# Patient Record
Sex: MALE | Race: WHITE | NOT HISPANIC OR LATINO | URBAN - METROPOLITAN AREA
[De-identification: names, ages, dates, MRNs, and addresses within clinical notes are randomized per-mention and may not be internally consistent; named-entity substitution may affect disease eponyms.]

---

## 2023-05-09 ENCOUNTER — TELEPHONE ENCOUNTER (OUTPATIENT)
Dept: URBAN - METROPOLITAN AREA CLINIC 113 | Facility: CLINIC | Age: 68
End: 2023-05-09

## 2023-05-09 ENCOUNTER — WEB ENCOUNTER (OUTPATIENT)
Dept: URBAN - METROPOLITAN AREA CLINIC 113 | Facility: CLINIC | Age: 68
End: 2023-05-09

## 2023-05-09 ENCOUNTER — OFFICE VISIT (OUTPATIENT)
Dept: URBAN - METROPOLITAN AREA CLINIC 113 | Facility: CLINIC | Age: 68
End: 2023-05-09
Payer: MEDICARE

## 2023-05-09 VITALS
WEIGHT: 292.8 LBS | BODY MASS INDEX: 33.88 KG/M2 | RESPIRATION RATE: 18 BRPM | DIASTOLIC BLOOD PRESSURE: 62 MMHG | TEMPERATURE: 97.3 F | HEIGHT: 78 IN | SYSTOLIC BLOOD PRESSURE: 124 MMHG | HEART RATE: 53 BPM

## 2023-05-09 DIAGNOSIS — N28.9 CHRONIC KIDNEY DISEASE: ICD-10-CM

## 2023-05-09 DIAGNOSIS — D50.0 ANEMIA: ICD-10-CM

## 2023-05-09 DIAGNOSIS — K74.60 CIRRHOSIS: ICD-10-CM

## 2023-05-09 PROCEDURE — 99203 OFFICE O/P NEW LOW 30 MIN: CPT | Performed by: INTERNAL MEDICINE

## 2023-05-09 RX ORDER — SODIUM ZIRCONIUM CYCLOSILICATE 10 G/10G
1 PACKET DISSOLVED IN WATER POWDER, FOR SUSPENSION ORAL ONCE A DAY
Status: ACTIVE | COMMUNITY

## 2023-05-09 RX ORDER — NEBIVOLOL HYDROCHLORIDE 5 MG/1
1 TABLET TABLET ORAL ONCE A DAY
Status: ACTIVE | COMMUNITY

## 2023-05-09 RX ORDER — FUROSEMIDE 40 MG/1
1 TABLET TABLET ORAL ONCE A DAY
Status: ACTIVE | COMMUNITY

## 2023-05-09 RX ORDER — LEVOTHYROXINE SODIUM 50 UG/1
1 TABLET IN THE MORNING ON AN EMPTY STOMACH TABLET ORAL ONCE A DAY
Status: ACTIVE | COMMUNITY

## 2023-05-09 RX ORDER — ALLOPURINOL 100 MG/1
1 TABLET TABLET ORAL ONCE A DAY
Status: ACTIVE | COMMUNITY

## 2023-05-09 RX ORDER — LISINOPRIL 20 MG/1
1 TABLET TABLET ORAL ONCE A DAY
Status: ACTIVE | COMMUNITY

## 2023-05-09 NOTE — HPI-TODAY'S VISIT:
. Labs May 4, 2023.  Sodium 139, potassium 5.5.  BUN 48, creatinine 2.1.  AST 21, ALT 10.  Alk phos is 141.  Albumin 3.7.  Total bilirubin 0.5.

## 2023-05-09 NOTE — HPI-TODAY'S VISIT:
Very pleasant 67-year-old man presents with a history of cirrhosis and ascites.  Previously managed by Dr. Keenan in [facility].  Recently relocated to our area. . He was originally diagnosed with cirrhosis when he developed ascites in August 2022.  He had been drinking alcohol on a daily basis prior to that.  He immediately stopped drinking alcohol.  He had about 3 paracentesis performed.  These were large-volume paracentesis.  He was previously on spironolactone and Lasix.  He stopped the spironolactone because of hyperkalemia.  He has actually had relatively easily controlled ascites since then.  He does not have any current major issues with edema.  He has chronic lymphedema.  Appetite is fine.  He does not have dysphagia heartburn or regurgitation.  He states he had a previous upper endoscopy which was notable for no varices.  He does not have diarrhea or constipation.  He does not have rectal bleeding or melena at this time.  Weight is actually relatively stable all considered. . He was seen by liver transplant physicians in the [facility].  Transplantation was not recommended due to stability of his chronic liver disease and cessation of alcohol. . He does have chronic kidney disease and was followed by nephrologist in the [facility].  We need to transition to a nephrologist here in our area.

## 2023-05-31 ENCOUNTER — TELEPHONE ENCOUNTER (OUTPATIENT)
Dept: URBAN - METROPOLITAN AREA CLINIC 6 | Facility: CLINIC | Age: 68
End: 2023-05-31

## 2023-09-12 ENCOUNTER — WEB ENCOUNTER (OUTPATIENT)
Dept: URBAN - METROPOLITAN AREA CLINIC 113 | Facility: CLINIC | Age: 68
End: 2023-09-12

## 2023-09-12 ENCOUNTER — OFFICE VISIT (OUTPATIENT)
Dept: URBAN - METROPOLITAN AREA CLINIC 113 | Facility: CLINIC | Age: 68
End: 2023-09-12
Payer: MEDICARE

## 2023-09-12 VITALS
HEART RATE: 65 BPM | HEIGHT: 78 IN | DIASTOLIC BLOOD PRESSURE: 46 MMHG | TEMPERATURE: 97.7 F | SYSTOLIC BLOOD PRESSURE: 112 MMHG | BODY MASS INDEX: 32.74 KG/M2 | WEIGHT: 283 LBS | RESPIRATION RATE: 18 BRPM

## 2023-09-12 DIAGNOSIS — D50.0 ANEMIA: ICD-10-CM

## 2023-09-12 DIAGNOSIS — K74.60 CIRRHOSIS: ICD-10-CM

## 2023-09-12 DIAGNOSIS — N18.9 CHRONIC KIDNEY DISEASE: ICD-10-CM

## 2023-09-12 PROCEDURE — 99214 OFFICE O/P EST MOD 30 MIN: CPT | Performed by: INTERNAL MEDICINE

## 2023-09-12 RX ORDER — LEVOTHYROXINE SODIUM 50 UG/1
1 TABLET IN THE MORNING ON AN EMPTY STOMACH TABLET ORAL ONCE A DAY
Status: ACTIVE | COMMUNITY

## 2023-09-12 RX ORDER — LISINOPRIL 20 MG/1
1 TABLET TABLET ORAL ONCE A DAY
Status: ACTIVE | COMMUNITY

## 2023-09-12 RX ORDER — COLCHICINE 0.6 MG/1
0.6MG TABLET, FILM COATED ORAL ONCE A DAY
Status: ACTIVE | COMMUNITY

## 2023-09-12 RX ORDER — GABAPENTIN 300 MG/1
1 CAPSULE CAPSULE ORAL ONCE A DAY
Status: ACTIVE | COMMUNITY

## 2023-09-12 RX ORDER — ALLOPURINOL 100 MG/1
1 TABLET TABLET ORAL ONCE A DAY
Status: ACTIVE | COMMUNITY

## 2023-09-12 RX ORDER — LORATADINE 10 MG
1 TABLET TABLET ORAL ONCE A DAY
Status: ACTIVE | COMMUNITY

## 2023-09-12 RX ORDER — SODIUM ZIRCONIUM CYCLOSILICATE 10 G/10G
1 PACKET DISSOLVED IN WATER POWDER, FOR SUSPENSION ORAL ONCE A DAY
Status: ON HOLD | COMMUNITY

## 2023-09-12 RX ORDER — FUROSEMIDE 40 MG/1
1 TABLET TABLET ORAL TWICE A DAY
Status: ACTIVE | COMMUNITY

## 2023-09-12 RX ORDER — DICLOFENAC SODIUM TOPICAL GEL, 1% 10 MG/G
AS DIRECTED GEL TOPICAL
Status: ACTIVE | COMMUNITY

## 2023-09-12 RX ORDER — MYCOPHENOLATE MOFETIL 500 MG/1
1 TABLET TABLET ORAL TWICE A DAY
Status: ACTIVE | COMMUNITY

## 2023-09-12 RX ORDER — NEBIVOLOL HYDROCHLORIDE 5 MG/1
1 TABLET TABLET ORAL ONCE A DAY
Status: ACTIVE | COMMUNITY

## 2023-09-12 NOTE — HPI-TODAY'S VISIT:
68-year-old man presents with a history of cirrhosis and ascites.  Previously managed by Dr. Keenan in [facility].  Recently relocated to our area. . From a hepatic standpoint, he is stable.  He follows a low-salt diet.  No ascites.  No significant edema.  He has significant gout.  He has seen Dr. Vu.  Treatment of gout is planned with Krystexxa.  No nausea vomiting.  No diarrhea or constipation.  No rectal bleeding or melena.  Weight is relatively stable.  Edema is controlled.  He was started on mycophenolate and plans for his gout therapy. . He was originally diagnosed with cirrhosis when he developed ascites in August 2022.  He had been drinking alcohol on a daily basis prior to that.  He immediately stopped drinking alcohol.  He had about 3 paracentesis performed.  These were large-volume paracentesis.  He was previously on spironolactone and Lasix.  He stopped the spironolactone because of hyperkalemia.  He has actually had relatively easily controlled ascites since then.   . He was seen by liver transplant physicians in the [facility].  Transplantation was not recommended due to stability of his chronic liver disease and cessation of alcohol. . He does have chronic kidney disease and was followed by nephrologist in the [facility].  He is now under the care of Dr. Vu. . He is a retired respiratory therapist . He has been diagnosed with gout and therapy with Krystexxa is planned.

## 2023-09-12 NOTE — HPI-TODAY'S VISIT:
. Labs August 2023.  White blood cell count 4.6.  Hemoglobin 12.0.  Platelet count 141,000.  BUN 41 creatinine 1.48.  AST 25, ALT 23.  Alkaline phos is 151.  Total bilirubin is 0.4. . Labs May 4, 2023.  Sodium 139, potassium 5.5.  BUN 48, creatinine 2.1.  AST 21, ALT 10.  Alk phos is 141.  Albumin 3.7.  Total bilirubin 0.5.

## 2023-09-12 NOTE — PHYSICAL EXAM LUNGS:
clear to auscultation bilaterally, good air movement , clear to auscultation bilaterally, good air movement

## 2024-05-09 ENCOUNTER — DASHBOARD ENCOUNTERS (OUTPATIENT)
Age: 69
End: 2024-05-09

## 2024-05-09 ENCOUNTER — LAB OUTSIDE AN ENCOUNTER (OUTPATIENT)
Dept: URBAN - METROPOLITAN AREA CLINIC 113 | Facility: CLINIC | Age: 69
End: 2024-05-09

## 2024-05-09 ENCOUNTER — OFFICE VISIT (OUTPATIENT)
Dept: URBAN - METROPOLITAN AREA CLINIC 113 | Facility: CLINIC | Age: 69
End: 2024-05-09
Payer: MEDICARE

## 2024-05-09 VITALS
HEIGHT: 78 IN | SYSTOLIC BLOOD PRESSURE: 123 MMHG | HEART RATE: 55 BPM | TEMPERATURE: 98.1 F | RESPIRATION RATE: 18 BRPM | WEIGHT: 315 LBS | DIASTOLIC BLOOD PRESSURE: 62 MMHG | BODY MASS INDEX: 36.45 KG/M2

## 2024-05-09 DIAGNOSIS — K74.60 CIRRHOSIS: ICD-10-CM

## 2024-05-09 DIAGNOSIS — D64.9 ANEMIA: ICD-10-CM

## 2024-05-09 DIAGNOSIS — N18.9 CHRONIC KIDNEY DISEASE: ICD-10-CM

## 2024-05-09 DIAGNOSIS — D50.0 ANEMIA: ICD-10-CM

## 2024-05-09 PROCEDURE — 99214 OFFICE O/P EST MOD 30 MIN: CPT | Performed by: INTERNAL MEDICINE

## 2024-05-09 RX ORDER — FUROSEMIDE 40 MG/1
1 TABLET TABLET ORAL TWICE A DAY
Status: ACTIVE | COMMUNITY

## 2024-05-09 RX ORDER — NEBIVOLOL HYDROCHLORIDE 5 MG/1
1 TABLET TABLET ORAL ONCE A DAY
Status: ACTIVE | COMMUNITY

## 2024-05-09 RX ORDER — PEGLOTICASE 8 MG/ML
1 ML INJECTION, SOLUTION INTRAVENOUS
Status: ACTIVE | COMMUNITY

## 2024-05-09 RX ORDER — COLCHICINE 0.6 MG/1
0.6MG TABLET, FILM COATED ORAL ONCE A DAY
Status: ACTIVE | COMMUNITY

## 2024-05-09 RX ORDER — MYCOPHENOLATE MOFETIL 500 MG/1
1 TABLET TABLET ORAL TWICE A DAY
Status: ACTIVE | COMMUNITY

## 2024-05-09 RX ORDER — LISINOPRIL 20 MG/1
1 TABLET TABLET ORAL ONCE A DAY
Status: ACTIVE | COMMUNITY

## 2024-05-09 RX ORDER — LORATADINE 10 MG
1 TABLET TABLET ORAL ONCE A DAY
Status: ACTIVE | COMMUNITY

## 2024-05-09 RX ORDER — GABAPENTIN 300 MG/1
1 CAPSULE CAPSULE ORAL ONCE A DAY
Status: ACTIVE | COMMUNITY

## 2024-11-04 ENCOUNTER — OFFICE VISIT (OUTPATIENT)
Dept: URBAN - METROPOLITAN AREA CLINIC 113 | Facility: CLINIC | Age: 69
End: 2024-11-04
Payer: MEDICARE

## 2024-11-04 ENCOUNTER — LAB OUTSIDE AN ENCOUNTER (OUTPATIENT)
Dept: URBAN - METROPOLITAN AREA CLINIC 113 | Facility: CLINIC | Age: 69
End: 2024-11-04

## 2024-11-04 VITALS
WEIGHT: 315 LBS | DIASTOLIC BLOOD PRESSURE: 83 MMHG | RESPIRATION RATE: 20 BRPM | TEMPERATURE: 97.7 F | HEIGHT: 78 IN | SYSTOLIC BLOOD PRESSURE: 123 MMHG | HEART RATE: 52 BPM | BODY MASS INDEX: 36.45 KG/M2

## 2024-11-04 DIAGNOSIS — Z86.0100 HISTORY OF COLON POLYPS: ICD-10-CM

## 2024-11-04 DIAGNOSIS — K74.60 CIRRHOSIS: ICD-10-CM

## 2024-11-04 PROBLEM — 428283002: Status: ACTIVE | Noted: 2024-11-04

## 2024-11-04 PROCEDURE — 99214 OFFICE O/P EST MOD 30 MIN: CPT | Performed by: NURSE PRACTITIONER

## 2024-11-04 RX ORDER — PEGLOTICASE 8 MG/ML
1 ML INJECTION, SOLUTION INTRAVENOUS
Status: ACTIVE | COMMUNITY

## 2024-11-04 RX ORDER — FUROSEMIDE 40 MG/1
1 TABLET TABLET ORAL ONCE A DAY
Status: ACTIVE | COMMUNITY

## 2024-11-04 RX ORDER — GABAPENTIN 600 MG/1
1 TABLET TABLET, FILM COATED ORAL ONCE A DAY
Status: ACTIVE | COMMUNITY

## 2024-11-04 RX ORDER — MYCOPHENOLATE MOFETIL 500 MG/1
1 TABLET TABLET ORAL TWICE A DAY
Status: ACTIVE | COMMUNITY

## 2024-11-04 RX ORDER — LISINOPRIL 20 MG/1
1 TABLET TABLET ORAL ONCE A DAY
Status: ACTIVE | COMMUNITY

## 2024-11-04 RX ORDER — COLCHICINE 0.6 MG/1
0.6MG TABLET, FILM COATED ORAL ONCE A DAY
Status: ACTIVE | COMMUNITY

## 2024-11-04 RX ORDER — NEBIVOLOL HYDROCHLORIDE 5 MG/1
1 TABLET TABLET ORAL ONCE A DAY
Status: ACTIVE | COMMUNITY

## 2024-11-04 RX ORDER — LORATADINE 10 MG
1 TABLET TABLET ORAL ONCE A DAY
Status: ACTIVE | COMMUNITY

## 2024-11-04 NOTE — HPI-TODAY'S VISIT:
This is a 69-year-old male with a history of asthma/COPD, hypertension, gout, diabetes, peripheral arterial disease, chronic kidney disease, and cirrhosis associated with alcohol use presenting for follow-up. He was last seen 5/9/2024.  He was doing well without significant edema or ascites.  He  continue to abstain from alcohol.  He had labs planned with nephrology.  He was to have an alpha-fetoprotein.  Endoscopy for variceal screening was recommended in 1 to 2 years.  An ultrasound was ordered. He reports labs were drawn with Dr. Vu.  Alpha-fetoprotein was not obtained. He is doing well from a GI standpoint.  He denies any abdominal symptoms.  He had a shoulder surgery scheduled.  He had preoperative labs at [facility] on 9/16 and labs with his primary care physician in September.  Surgery was canceled because he developed mild cellulitis in his left lower extremity.  Prior to surgery, he had a cardiac clearance which included echocardiogram and EKG.  Krystexxa and colchicine were restarted 6 months ago to treat gout. Abdominal ultrasound 5/23/2024:Nodular cirrhotic liver with likely geographic fat within the left lobe.  This could be evaluated with MRI if clinically indicated.  No liver mass.  Cholelithiasis without evidence of acute cholecystitis.  Nonobstructing left nephrolithiasis.  (Report received post visit) GI records from Dr. Benavides have been obtained since his last visit.  Office notes mention a colonoscopy 12/2/2019 during which 3 cecal polyps, 3 pedunculated and sessile polyps in the ascending colon including polyps over 1 cm and a small rectosigmoid polyp.  The recommendation was to repeat colonoscopy in 6 months.  This was not performed due to decompensated liver cirrhosis at the time.  He had an EGD 1/13/2023 notable for a scar in the lower third of the esophagus, salmon-colored mucosa status post biopsy, small hiatal hernia, LA grade a esophagitis, gastritis, and gastric varices without bleeding.  Distal esophageal biopsies demonstrated squamocolumnar epithelium with reactive changes and mild chronic inflammation with rare intraepithelial eosinophils consistent with reflux.  No dysplasia or intestinal metaplasia was found.  Repeat EGD recommended in 1 year.

## 2024-11-04 NOTE — HPI-OTHER HISTORIES
GI records from Dr. Benavides have been obtained since his last visit. Office notes mention a colonoscopy 12/2/2019 during which 3 cecal polyps, 3 pedunculated and sessile polyps in the ascending colon including polyps over 1 cm and a small rectosigmoid polyp. The recommendation was to repeat colonoscopy in 6 months. This was not performed due to decompensated liver cirrhosis at the time. He had an EGD 1/13/2023 notable for a scar in the lower third of the esophagus, salmon-colored mucosa status post biopsy, small hiatal hernia, LA grade a esophagitis, gastritis, and gastric varices without bleeding. Distal esophageal biopsies demonstrated squamocolumnar epithelium with reactive changes and mild chronic inflammation with rare intraepithelial eosinophils consistent with reflux. No dysplasia or intestinal metaplasia was found. Repeat EGD recommended in 1 year.

## 2024-11-05 ENCOUNTER — TELEPHONE ENCOUNTER (OUTPATIENT)
Dept: URBAN - METROPOLITAN AREA CLINIC 113 | Facility: CLINIC | Age: 69
End: 2024-11-05

## 2024-11-07 ENCOUNTER — LAB OUTSIDE AN ENCOUNTER (OUTPATIENT)
Dept: URBAN - METROPOLITAN AREA CLINIC 113 | Facility: CLINIC | Age: 69
End: 2024-11-07

## 2025-01-29 ENCOUNTER — OFFICE VISIT (OUTPATIENT)
Dept: URBAN - METROPOLITAN AREA MEDICAL CENTER 19 | Facility: MEDICAL CENTER | Age: 70
End: 2025-01-29

## 2025-01-29 ENCOUNTER — TELEPHONE ENCOUNTER (OUTPATIENT)
Dept: URBAN - METROPOLITAN AREA CLINIC 113 | Facility: CLINIC | Age: 70
End: 2025-01-29

## 2025-01-29 ENCOUNTER — LAB OUTSIDE AN ENCOUNTER (OUTPATIENT)
Dept: URBAN - METROPOLITAN AREA CLINIC 113 | Facility: CLINIC | Age: 70
End: 2025-01-29

## 2025-01-29 RX ORDER — PEGLOTICASE 8 MG/ML
1 ML INJECTION, SOLUTION INTRAVENOUS
Status: ACTIVE | COMMUNITY

## 2025-01-29 RX ORDER — LISINOPRIL 20 MG/1
1 TABLET TABLET ORAL ONCE A DAY
Status: ACTIVE | COMMUNITY

## 2025-01-29 RX ORDER — FUROSEMIDE 40 MG/1
1 TABLET TABLET ORAL ONCE A DAY
Status: ACTIVE | COMMUNITY

## 2025-01-29 RX ORDER — NEBIVOLOL HYDROCHLORIDE 5 MG/1
1 TABLET TABLET ORAL ONCE A DAY
Status: ACTIVE | COMMUNITY

## 2025-01-29 RX ORDER — COLCHICINE 0.6 MG/1
0.6MG TABLET, FILM COATED ORAL ONCE A DAY
Status: ACTIVE | COMMUNITY

## 2025-01-29 RX ORDER — MYCOPHENOLATE MOFETIL 500 MG/1
1 TABLET TABLET ORAL TWICE A DAY
Status: ACTIVE | COMMUNITY

## 2025-01-29 RX ORDER — PANTOPRAZOLE SODIUM 40 MG/1
1 TABLET TABLET, DELAYED RELEASE ORAL ONCE A DAY
Qty: 90 TABLET | Refills: 3 | OUTPATIENT
Start: 2025-01-29

## 2025-01-29 RX ORDER — GABAPENTIN 600 MG/1
1 TABLET TABLET, FILM COATED ORAL ONCE A DAY
Status: ACTIVE | COMMUNITY

## 2025-01-29 RX ORDER — LORATADINE 10 MG
1 TABLET TABLET ORAL ONCE A DAY
Status: ACTIVE | COMMUNITY

## 2025-02-18 ENCOUNTER — OFFICE VISIT (OUTPATIENT)
Dept: URBAN - METROPOLITAN AREA CLINIC 113 | Facility: CLINIC | Age: 70
End: 2025-02-18
Payer: MEDICARE

## 2025-02-18 VITALS
SYSTOLIC BLOOD PRESSURE: 125 MMHG | TEMPERATURE: 97.3 F | HEIGHT: 78 IN | BODY MASS INDEX: 36.45 KG/M2 | WEIGHT: 315 LBS | DIASTOLIC BLOOD PRESSURE: 66 MMHG | RESPIRATION RATE: 18 BRPM | HEART RATE: 58 BPM

## 2025-02-18 DIAGNOSIS — N18.9 CHRONIC KIDNEY DISEASE: ICD-10-CM

## 2025-02-18 DIAGNOSIS — K74.60 CIRRHOSIS: ICD-10-CM

## 2025-02-18 DIAGNOSIS — D50.0 ANEMIA: ICD-10-CM

## 2025-02-18 DIAGNOSIS — Z86.0100 HISTORY OF COLON POLYPS: ICD-10-CM

## 2025-02-18 DIAGNOSIS — K20.90 ESOPHAGITIS: ICD-10-CM

## 2025-02-18 DIAGNOSIS — K80.20 CHOLELITHIASES: ICD-10-CM

## 2025-02-18 DIAGNOSIS — D64.9 ANEMIA: ICD-10-CM

## 2025-02-18 PROCEDURE — 99213 OFFICE O/P EST LOW 20 MIN: CPT | Performed by: INTERNAL MEDICINE

## 2025-02-18 PROCEDURE — 99214 OFFICE O/P EST MOD 30 MIN: CPT | Performed by: INTERNAL MEDICINE

## 2025-02-18 RX ORDER — LORATADINE 10 MG
1 TABLET TABLET ORAL ONCE A DAY
Status: ACTIVE | COMMUNITY

## 2025-02-18 RX ORDER — PEGLOTICASE 8 MG/ML
1 ML INJECTION, SOLUTION INTRAVENOUS
Status: ACTIVE | COMMUNITY

## 2025-02-18 RX ORDER — PANTOPRAZOLE SODIUM 40 MG/1
1 TABLET TABLET, DELAYED RELEASE ORAL ONCE A DAY
Qty: 90 TABLET | Refills: 3 | Status: ACTIVE | COMMUNITY
Start: 2025-01-29

## 2025-02-18 RX ORDER — LISINOPRIL 20 MG/1
1 TABLET TABLET ORAL ONCE A DAY
Status: ACTIVE | COMMUNITY

## 2025-02-18 RX ORDER — GABAPENTIN 600 MG/1
1 TABLET TABLET, FILM COATED ORAL ONCE A DAY
Status: ACTIVE | COMMUNITY

## 2025-02-18 RX ORDER — NEBIVOLOL HYDROCHLORIDE 5 MG/1
1 TABLET TABLET ORAL ONCE A DAY
Status: ACTIVE | COMMUNITY

## 2025-02-18 RX ORDER — COLCHICINE 0.6 MG/1
0.6MG TABLET, FILM COATED ORAL ONCE A DAY
Status: ACTIVE | COMMUNITY

## 2025-02-18 RX ORDER — FUROSEMIDE 40 MG/1
1 TABLET TABLET ORAL ONCE A DAY
Status: ACTIVE | COMMUNITY

## 2025-02-18 RX ORDER — MYCOPHENOLATE MOFETIL 500 MG/1
1 TABLET TABLET ORAL TWICE A DAY
Status: ACTIVE | COMMUNITY

## 2025-02-18 NOTE — HPI-TODAY'S VISIT:
69-year-old male with a history of asthma/COPD, hypertension, gout, diabetes, peripheral arterial disease, chronic kidney disease, and cirrhosis associated with alcohol use presenting for follow up of esophagitis and cirrhosis  He is not having any current GI symptoms. No reflux. He is compliant with pantoprazole. No dysphagia. Regular bowel movements. No diarrhea constipation. No rectal bleeding or melena. He has chronic kidney disease. He is followed by Dr. Vu for this issue. No nausea or vomiting. Has significant obesity. He will be discussing perhaps options for management at the time of his follow-up visit with Dr. Vu  Labs. December 2024. Creatinine 2.0, liver tests were normal, platelet count 179K, hemoglobin 12.4.  Labs. December 2024. Alpha-fetoprotein less than 2  Abdominal ultrasound. December 2024. Cholelithiasis. Cirrhosis noted. No liver lesions identified.  Colonoscopy January 29, 2025. Technically difficult colonoscopy due to sigmoid redundancy thick mucoid cecal material. 4 distinct 3 mm - 8 mm sessile cecal and ascending polyps removed by cold snare provide millimeter sessile sigmoid colon polyp removed by cold snare. Biopsies were notable for tubular adenomas. No dysplasia.  EGD. January 29, 2025. Granular mucosa notable at the GE junction. LA grade B reflux esophagitis one of the GE junction. Small grade 1 esophageal varices from 41 to 43 cm. Mild gastritis. Biopsies were negative for H. pylori.  Labs. December 2024. Hemoglobin 12.0, MCV 96, platelet count 181K, creatinine 1.9, AST 17, ALT 31, alkaline phosphatase 94, AFP less than 2, INR 1.0  Krystexxa and colchicine were restarted 6 months ago to treat gout.  Abdominal ultrasound 5/23/2024:Nodular cirrhotic liver with likely geographic fat within the left lobe.  This could be evaluated with MRI if clinically indicated.  No liver mass.  Cholelithiasis without evidence of acute cholecystitis.  Nonobstructing left nephrolithiasis.  (Report received post visit)  Colonoscopy 12/2/2019 during which 3 cecal polyps, 3 pedunculated and sessile polyps in the ascending colon including polyps over 1 cm and a small rectosigmoid polyp.  T  EGD 1/13/2023 notable for a scar in the lower third of the esophagus, salmon-colored mucosa status post biopsy, small hiatal hernia, LA grade A esophagitis, gastritis, and gastric varices without bleeding.  Distal esophageal biopsies demonstrated squamocolumnar epithelium with reactive changes and mild chronic inflammation with rare intraepithelial eosinophils consistent with reflux.  No dysplasia or intestinal metaplasia was found.

## 2025-05-23 ENCOUNTER — OFFICE VISIT (OUTPATIENT)
Dept: URBAN - METROPOLITAN AREA CLINIC 113 | Facility: CLINIC | Age: 70
End: 2025-05-23
Payer: MEDICARE

## 2025-05-23 ENCOUNTER — LAB OUTSIDE AN ENCOUNTER (OUTPATIENT)
Dept: URBAN - METROPOLITAN AREA CLINIC 113 | Facility: CLINIC | Age: 70
End: 2025-05-23

## 2025-05-23 DIAGNOSIS — K74.60 CIRRHOSIS: ICD-10-CM

## 2025-05-23 DIAGNOSIS — K20.90 ESOPHAGITIS: ICD-10-CM

## 2025-05-23 DIAGNOSIS — I85.10 SECONDARY ESOPHAGEAL VARICES WITHOUT BLEEDING: ICD-10-CM

## 2025-05-23 PROBLEM — 14223005: Status: ACTIVE | Noted: 2025-05-23

## 2025-05-23 PROCEDURE — 99214 OFFICE O/P EST MOD 30 MIN: CPT | Performed by: NURSE PRACTITIONER

## 2025-05-23 RX ORDER — LISINOPRIL 20 MG/1
1 TABLET TABLET ORAL ONCE A DAY
Status: ACTIVE | COMMUNITY

## 2025-05-23 RX ORDER — MYCOPHENOLATE MOFETIL 500 MG/1
1 TABLET TABLET ORAL TWICE A DAY
Status: ON HOLD | COMMUNITY

## 2025-05-23 RX ORDER — PANTOPRAZOLE SODIUM 40 MG/1
1 TABLET TABLET, DELAYED RELEASE ORAL ONCE A DAY
Qty: 90 TABLET | Refills: 3 | Status: ACTIVE | COMMUNITY
Start: 2025-01-29

## 2025-05-23 RX ORDER — PEGLOTICASE 8 MG/ML
1 ML INJECTION, SOLUTION INTRAVENOUS
Status: ON HOLD | COMMUNITY

## 2025-05-23 RX ORDER — NEBIVOLOL HYDROCHLORIDE 5 MG/1
1 TABLET TABLET ORAL ONCE A DAY
Status: ACTIVE | COMMUNITY

## 2025-05-23 RX ORDER — COLCHICINE 0.6 MG/1
0.6MG TABLET, FILM COATED ORAL ONCE A DAY
Status: ACTIVE | COMMUNITY

## 2025-05-23 RX ORDER — FUROSEMIDE 40 MG/1
1 TABLET TABLET ORAL ONCE A DAY
Status: ACTIVE | COMMUNITY

## 2025-05-23 RX ORDER — GABAPENTIN 600 MG/1
1 TABLET TABLET, FILM COATED ORAL ONCE A DAY
Status: ACTIVE | COMMUNITY

## 2025-05-23 RX ORDER — ALLOPURINOL 100 MG/1
1 TABLET TABLET ORAL ONCE A DAY
Status: ACTIVE | COMMUNITY

## 2025-05-23 RX ORDER — LORATADINE 10 MG
1 TABLET TABLET ORAL ONCE A DAY
Status: ACTIVE | COMMUNITY

## 2025-05-23 NOTE — HPI-OTHER HISTORIES
Labs 4/17/2025:CBC: WBC 4.5, hemoglobin 12.2, MCV 94, platelet 205. BMP normal with exception of BUN 29, creatinine 1.68, GFR 44, potassium 5.3, albumin 3.8. LFTs: TB 0.3, , ALT 12, AST 11. Cholesterol panel normal. PSA 1.3.  Colonoscopy January 29, 2025. Technically difficult colonoscopy due to sigmoid redundancy thick mucoid cecal material. 4 distinct 3 mm - 8 mm sessile cecal and ascending polyps removed by cold snare provide millimeter sessile sigmoid colon polyp removed by cold snare. Biopsies were notable for tubular adenomas. No dysplasia.  EGD. January 29, 2025. Granular mucosa notable at the GE junction. LA grade B reflux esophagitis one of the GE junction. Small grade 1 esophageal varices from 41 to 43 cm. Mild gastritis. Biopsies were negative for H. pylori.   Labs. December 2024. Hemoglobin 12.0, MCV 96, platelet count 181K, creatinine 1.9, AST 17, ALT 31, alkaline phosphatase 94, AFP less than 2, INR 1.0 Krystexxa and colchicine were restarted 6 months ago to treat gout.   Abdominal ultrasound 5/23/2024:Nodular cirrhotic liver with likely geographic fat within the left lobe. This could be evaluated with MRI if clinically indicated. No liver mass. Cholelithiasis without evidence of acute cholecystitis. Nonobstructing left nephrolithiasis.  Colonoscopy 12/2/2019 during which 3 cecal polyps, 3 pedunculated and sessile polyps in the ascending colon including polyps over 1 cm and a small rectosigmoid polyp.   EGD 1/13/2023 notable for a scar in the lower third of the esophagus, salmon-colored mucosa status post biopsy, small hiatal hernia, LA grade A esophagitis, gastritis, and gastric varices without bleeding. Distal esophageal biopsies demonstrated squamocolumnar epithelium with reactive changes and mild chronic inflammation with rare intraepithelial eosinophils consistent with reflux. No dysplasia or intestinal metaplasia was found.

## 2025-05-23 NOTE — HPI-TODAY'S VISIT:
Pt admitted to [facility] and started on cefepime, remained afebrile and without leukocytosis. Urine culture growing GNRs, resulted as pan-sensitive Klebsiella. Pt continued to remain stable overnight and into 9/27. Pt deemed appropriate for discharge to complete PO cefdinir at home. Has urology procedure on 9/28 at [facility]. Plan discussed with pt, who was agreeable and amenable; medications were discussed and reviewed, outpatient follow-up scheduled, ER precautions were given, all questions were answered to the pt's satisfaction, and Mr. Nichole was subsequently discharged.     This is a 69-year-old male with a history of asthma/COPD, hypertension, gout, impaired fasting glucose, peripheral arterial disease, chronic kidney disease, adenomatous colon polyps due surveillance in 2028, cirrhosis associated with alcoholic fatty liver, Grade 1 esophageal varices, and esophagitis presenting for follow-up.  He was last seen 2/18/2025 by Dr. Healy.  Regarding cirrhosis, he was to make every effort toward weight reduction.  Recent ultrasound did not reveal a significant hepatic abnormality.  He did not have thrombocytopenia.  Recent EGD notable for minimal esophageal varices.  Repeat upper endoscopy in 1 to 2 years recommended.  Recent alpha-fetoprotein was 2.2.  He denies significant acid reflux.  Esophagitis was also identified on  upper endoscopy.  Pantoprazole 40 mg daily was recommended indefinitely.  He had a recent colonoscopy notable for removal of adenomatous colon polyps.  Surveillance recommended in 2028.  He had recent labs with Dr. Nunez.  He denies any abdominal symptoms.  He continues to abstain from alcohol.  He has been consistently gaining weight.  He has a history of sleep apnea and has not required CPAP due to prior weight loss.  He reports snoring has worsened and he is scheduled for a sleep study. He continues to require an orthopedic boot on his left lower extremity due to a tarsal fracture 3 years ago.  He has chronic left leg swelling.  He has recently seen an orthopedic surgeon.  There is discussion regarding repair of an old right shoulder injury and eventual tarsal repair. He nephrologist has mentioned that he should consider discussing Mounjaro with his PCP.

## 2025-05-23 NOTE — PHYSICAL EXAM CONSTITUTIONAL:
well developed, well nourished , in no acute distress; LLE in orthopedic boot; using a walker for ambulation

## 2025-05-27 LAB — AFP, SERUM, TUMOR MARKER: 2.4
